# Patient Record
Sex: FEMALE | Race: WHITE | NOT HISPANIC OR LATINO | URBAN - METROPOLITAN AREA
[De-identification: names, ages, dates, MRNs, and addresses within clinical notes are randomized per-mention and may not be internally consistent; named-entity substitution may affect disease eponyms.]

---

## 2021-07-21 ENCOUNTER — NURSE TRIAGE (OUTPATIENT)
Dept: OTHER | Facility: OTHER | Age: 21
End: 2021-07-21

## 2021-07-21 DIAGNOSIS — Z20.828 SARS-ASSOCIATED CORONAVIRUS EXPOSURE: Primary | ICD-10-CM

## 2021-07-21 PROCEDURE — U0005 INFEC AGEN DETEC AMPLI PROBE: HCPCS | Performed by: FAMILY MEDICINE

## 2021-07-21 PROCEDURE — U0003 INFECTIOUS AGENT DETECTION BY NUCLEIC ACID (DNA OR RNA); SEVERE ACUTE RESPIRATORY SYNDROME CORONAVIRUS 2 (SARS-COV-2) (CORONAVIRUS DISEASE [COVID-19]), AMPLIFIED PROBE TECHNIQUE, MAKING USE OF HIGH THROUGHPUT TECHNOLOGIES AS DESCRIBED BY CMS-2020-01-R: HCPCS | Performed by: FAMILY MEDICINE

## 2021-07-21 NOTE — TELEPHONE ENCOUNTER
Regarding: COVID-19 Symptomatic - congested, sore throat  ----- Message from Terrance Santos sent at 7/21/2021  2:48 PM EDT -----  " I am congested, sore throat, and I am fatigue "

## 2021-07-21 NOTE — TELEPHONE ENCOUNTER
Reason for Disposition   [1] COVID-19 infection suspected by caller or triager AND [2] mild symptoms (cough, fever, or others) AND [1] no complications or SOB    Answer Assessment - Initial Assessment Questions  Were you within 6 feet or less, for up to 15 minutes or more with a person that has a confirmed COVID-19 test?        Denies         What was the date of your exposure?        n/a         Are you experiencing any symptoms attributed to the virus?  (Assess for SOB, cough, fever, difficulty breathing)        Yes  Congestion, sore throat, fatigue         HIGH RISK: Do you have any history heart or lung conditions, weakened immune system, diabetes, Asthma, CHF, HIV, COPD, Chemo, renal failure, sickle cell, etc?        Denies         PREGNANCY: Are you pregnant or did you recently give birth?         Denies    Protocols used: CORONAVIRUS (COVID-19) DIAGNOSED OR SUSPECTED-ADULT-[place]

## 2021-07-23 ENCOUNTER — OFFICE VISIT (OUTPATIENT)
Dept: URGENT CARE | Facility: CLINIC | Age: 21
End: 2021-07-23
Payer: COMMERCIAL

## 2021-07-23 VITALS
HEART RATE: 96 BPM | WEIGHT: 163 LBS | TEMPERATURE: 99.8 F | HEIGHT: 70 IN | SYSTOLIC BLOOD PRESSURE: 123 MMHG | DIASTOLIC BLOOD PRESSURE: 68 MMHG | BODY MASS INDEX: 23.34 KG/M2 | OXYGEN SATURATION: 99 % | RESPIRATION RATE: 18 BRPM

## 2021-07-23 DIAGNOSIS — J02.9 SORE THROAT: Primary | ICD-10-CM

## 2021-07-23 LAB — S PYO AG THROAT QL: NEGATIVE

## 2021-07-23 PROCEDURE — 87880 STREP A ASSAY W/OPTIC: CPT | Performed by: PHYSICIAN ASSISTANT

## 2021-07-23 PROCEDURE — 87070 CULTURE OTHR SPECIMN AEROBIC: CPT | Performed by: PHYSICIAN ASSISTANT

## 2021-07-23 PROCEDURE — 99213 OFFICE O/P EST LOW 20 MIN: CPT | Performed by: PHYSICIAN ASSISTANT

## 2021-07-23 RX ORDER — NORETHINDRONE ACETATE AND ETHINYL ESTRADIOL 1; .02 MG/1; MG/1
TABLET ORAL
COMMUNITY

## 2021-07-23 RX ORDER — METHYLPREDNISOLONE 4 MG/1
TABLET ORAL
Qty: 21 TABLET | Refills: 0 | Status: SHIPPED | OUTPATIENT
Start: 2021-07-23

## 2021-07-23 NOTE — PROGRESS NOTES
[address]        NAME: Vince Mcnamara is a 24 y o  female  : 2000    MRN: [id number]  DATE: 2021  TIME: 3:04 PM    Assessment and Plan   Sore throat [J02 9]  1  Sore throat  Mononucleosis screen    POCT rapid strepA    Throat culture    methylPREDNISolone 4 MG tablet therapy pack         Patient Instructions     Patient Instructions     Negative rapid strep here  She had a negative strep and culture at another clinic  Possible mono versus adenovirus  We can check a Monospot  Prednisone for sore throat and inflammation  Tylenol or ibuprofen as well  Close monitoring  If worsening symptoms follow-up here or with PCP  Follow up with PCP in 3-5 days  Proceed to  ER if symptoms worsen  Chief Complaint     Chief Complaint   Patient presents with    Sore Throat     Pt reports of worsening sore throat started approx 6 days ago  History of Present Illness        26year-old female presents with 1 week of sore throat fatigue painful swallowing  She was seen another urgent care with a negative rapid strep and reports a negative throat culture as well  She denies a fever but has been taking ibuprofen  No cough or runny nose  No nausea vomiting   Diarrhea  her boyfriend was sick last week but he had more URI symptoms and sore throat  Review of Systems   Review of Systems   Constitutional: Positive for fatigue  Negative for chills and fever  HENT: Positive for sore throat and trouble swallowing  Negative for congestion, ear pain, postnasal drip, rhinorrhea, sinus pressure and sinus pain  Eyes: Negative for visual disturbance  Respiratory: Negative for chest tightness and shortness of breath  Cardiovascular: Negative for chest pain and palpitations  Gastrointestinal: Negative for diarrhea, nausea and vomiting  Neurological: Negative for dizziness           Current Medications       Current Outpatient Medications:     methylPREDNISolone 4 MG tablet therapy pack, Use as directed on package, Disp: 21 tablet, Rfl: 0    norethindrone-ethinyl estradiol (Loestrin 1/20, 21,) 1-20 MG-MCG per tablet, Take by mouth, Disp: , Rfl:     Current Allergies     Allergies as of 07/23/2021    (No Known Allergies)            The following portions of the patient's history were reviewed and updated as appropriate: allergies, current medications, past family history, past medical history, past social history, past surgical history and problem list      History reviewed  No pertinent past medical history  Past Surgical History:   Procedure Laterality Date    CYST REMOVAL         History reviewed  No pertinent family history  Medications have been verified  Objective   /68   Pulse 96   Temp 99 8 °F (37 7 °C)   Resp 18   Ht 5' 10" (1 778 m)   Wt 73 9 kg (163 lb)   SpO2 99%   BMI 23 39 kg/m²        Physical Exam     Physical Exam  Constitutional:       General: She is not in acute distress  Appearance: She is well-developed  HENT:      Head: Normocephalic and atraumatic  Right Ear: Tympanic membrane, ear canal and external ear normal  No middle ear effusion  Tympanic membrane is not erythematous, retracted or bulging  Left Ear: Tympanic membrane, ear canal and external ear normal   No middle ear effusion  Tympanic membrane is not erythematous, retracted or bulging  Nose: Nose normal  No mucosal edema or rhinorrhea  Right Sinus: No maxillary sinus tenderness or frontal sinus tenderness  Left Sinus: No maxillary sinus tenderness or frontal sinus tenderness  Mouth/Throat:      Pharynx: Uvula midline  Pharyngeal swelling, oropharyngeal exudate and posterior oropharyngeal erythema present  No uvula swelling  Tonsils: Tonsillar exudate present  No tonsillar abscesses  2+ on the right  2+ on the left  Eyes:      General:         Right eye: No discharge  Left eye: No discharge        Conjunctiva/sclera: Conjunctivae normal       Right eye: Right conjunctiva is not injected  No chemosis  Left eye: Left conjunctiva is not injected  No chemosis  Pupils: Pupils are equal, round, and reactive to light  Cardiovascular:      Rate and Rhythm: Normal rate and regular rhythm  Heart sounds: Normal heart sounds  Pulmonary:      Effort: Pulmonary effort is normal  No respiratory distress  Breath sounds: Normal breath sounds  No wheezing or rales  Musculoskeletal:      Cervical back: Normal range of motion and neck supple  Lymphadenopathy:      Cervical: Cervical adenopathy present  Right cervical: Superficial cervical adenopathy present  Left cervical: Superficial cervical adenopathy present  Skin:     General: Skin is warm and dry  Findings: No rash  Neurological:      Mental Status: She is alert and oriented to person, place, and time  Cranial Nerves: No cranial nerve deficit

## 2021-07-23 NOTE — PATIENT INSTRUCTIONS
Negative rapid strep here  She had a negative strep and culture at another clinic  Possible mono versus adenovirus  We can check a Monospot  Prednisone for sore throat and inflammation  Tylenol or ibuprofen as well  Close monitoring  If worsening symptoms follow-up here or with PCP

## 2021-07-25 LAB — BACTERIA THROAT CULT: NORMAL

## 2023-12-14 ENCOUNTER — SEXUAL HEALTH (OUTPATIENT)
Dept: SURGERY | Facility: CLINIC | Age: 23
End: 2023-12-14

## 2023-12-14 ENCOUNTER — DOCUMENTATION (OUTPATIENT)
Dept: SURGERY | Facility: CLINIC | Age: 23
End: 2023-12-14

## 2023-12-14 DIAGNOSIS — Z11.3 SCREENING FOR STD (SEXUALLY TRANSMITTED DISEASE): Primary | ICD-10-CM

## 2023-12-14 PROBLEM — B96.89 BACTERIAL VAGINITIS: Status: ACTIVE | Noted: 2023-12-14

## 2023-12-14 PROBLEM — B37.31 VAGINAL CANDIDA: Status: ACTIVE | Noted: 2023-12-14

## 2023-12-14 PROBLEM — N76.0 BACTERIAL VAGINITIS: Status: ACTIVE | Noted: 2023-12-14

## 2023-12-14 NOTE — PROGRESS NOTES
Assessment/Plan:    Vaginal swab collected for GC/CT testing. Wet mount: moderate amount of clue cells and few epithelial cells   Wet mount: small amount of yeast cells and epithelial cells  Blood collected for HIV/syphilis testing. Recommend safer sex practices including 100% condom use. Recommend regular STD testing. Follow up 1 week for results. Patient was given a chance to ask questions and discuss any concerns issue might have regarding STDs or HIV. CRNP did explain the procedure and process involving vaginal swabing sample and bimanual exam.  Patient verbally gave consent. Practicing safe sex using a condom for each sexually encounter. Condoms offer the best protection against STD's by acting as a physical barrier to prevent the exchange of semen, vaginal fluids, and blood between partners. Condoms are not a 100% effective in protection. Reducing the number of sexual partners can also help to reduce the risk of the transmission of STD's along with regular STD screening. Bacterial vaginosis infection:  Treat with metronidazole 500 mg BID X 7 days. No sex for 1 week  No ETOH for 1 week while taking this medication  No using douching, lotions,wipes, or cream to vaginal area  Wear cotton underwear. Avoid wearing sweaty clothing after exercising  Educational paper was provided  Pt denies allergies  Recommend probiotics with use of medication  Reviewed allergies  Reviewed side effects  Reviewed adverse reactions and when to see out 911   Vaginal Yeast:  Treat with clotrimazole cream 1% apply 1 inch into vaginal area nightly X 7 doses  No sex for 1 week  Reviewed allergies  Reviewed side effects  Reviewed adverse reactions and when to see out 911    Diagnoses and all orders for this visit:    Screening for STD (sexually transmitted disease)          Subjective:      Patient ID: Herman Tejada is a 26 y.o. female.     HPI  She is here to be tested for STD testing and never with blood test before. Denies all  symptoms. Has inconsistent condom use. She denies any vaginal discharge, burning, itching, pain, lesions, lumps or bumps. She does not endorse any fever, chills, nausea, vomiting, cough, SOB, diarrhea, or night sweats. The following portions of the patient's history were reviewed and updated as appropriate: allergies and past social history. Review of Systems   Genitourinary: Negative. Objective: There were no vitals taken for this visit. Physical Exam  Nursing note reviewed. Constitutional:       General: She is not in acute distress. Appearance: Normal appearance. She is not ill-appearing. Genitourinary:     Exam position: Supine. Vagina: Normal.      Cervix: Discharge present. Uterus: Normal.       Adnexa: Right adnexa normal and left adnexa normal.      Comments: Small amount of thin white odorless discharge. Neurological:      Mental Status: She is alert. CHIEF CONCERN(S)    No LMP recorded. LPS 11/29/23    Birth Control Method Combination Pill    Condom Used: Occasionally    Sexual Preference :  Bisexual    Date of Last Sexual Exposure: 12/7/23    # of Partners: Last 30 days 1, Last 90 days 2, and Last Year 3    Sexual Practices: Oral and Vaginal      Previously Sexually Transmitted Diseases  Type Date Source of Care Treatment Comment   Denies                         Test Date Results   RPR 12/22 Denies   [facility]IC 12/22 Denies   GC 12/22 Denies   CT 12/22 Denies         1. CURRENT RISK BEHAVIOR(S)    Unprotected sex with multiple/anonymous partners and Other     PREVIOUS SUCCESSES    Used condoms when having sex, Maintained a monogamous relationship with only one partner, Practiced abstinence, and Discussed condom use prior to having sex with partner(s)        3.  SAFER GOAL BEHAVIOR(S)    Always use condoms to have sex, Practice abstinence, Pre-exposure prophylaxis (PrEP), Practice monogamy, and Get tested if condom breaks/ leaks          4. PERSON ACTION PLAN:    > BARRIERS:    No condom use or discussions and Get involved in a monogamist relationship    > BENEFITS:    Obtain free condoms from [facility] to decrease STD exposure and Provides a healthier sexual relationship and can reduce STD's        > ACTION STEPS:      Use condoms at least 50% of the time and steadily increase over time until condom use is 100% of the time, Choose to abstain from sex, Discuss condom use prior to having sex with partner(s), and Get tested is an exposure occurred such as a condom breaks/ leaked          4.  REFERRALS:  HIV consent given

## 2023-12-14 NOTE — PROGRESS NOTES
Per order, patient given Metronidazole 500mg PO BID for 7 days and Clotrimazole Cream 1% apply 1 inch into vaginal area nightly X 7 doses. Medications and instructions reviewed. Allergic reactions such as, a rash, shortness of breath, swelling of face, lips, tongue, or throat, or any other signs of an allergic reaction should develop, patient instructed to call 911 and go to the nearest emergency room. Patient agreeable with the plan and acknowledged understanding. Patient was able to ask questions and comprehensive answers were provided to the best of my ability. Patient encouraged to return with any new symptoms, new sexual partners and at least once yearly.

## 2023-12-21 ENCOUNTER — SEXUAL HEALTH (OUTPATIENT)
Dept: SURGERY | Facility: CLINIC | Age: 23
End: 2023-12-21

## 2023-12-21 DIAGNOSIS — Z71.2 ENCOUNTER TO DISCUSS TEST RESULTS: Primary | ICD-10-CM

## 2023-12-21 NOTE — PROGRESS NOTES
Contacted patient to discuss test results. Patient's results were negative for HIV, syphilis RPR, chlamydia and gonorrhea. Education provided for safe sex practices and was advised to return to clinic for routine screenings every three months or if exposure to STI or STI symptoms begin. Copy of results provided. Patient verbalized understanding.